# Patient Record
Sex: FEMALE | Race: WHITE | NOT HISPANIC OR LATINO | URBAN - METROPOLITAN AREA
[De-identification: names, ages, dates, MRNs, and addresses within clinical notes are randomized per-mention and may not be internally consistent; named-entity substitution may affect disease eponyms.]

---

## 2017-08-28 PROBLEM — E03.9 HYPOTHYROIDISM AFFECTING PREGNANCY IN SECOND TRIMESTER: Status: ACTIVE | Noted: 2017-08-28

## 2017-08-28 PROBLEM — O99.212 OBESITY AFFECTING PREGNANCY IN SECOND TRIMESTER: Status: ACTIVE | Noted: 2017-08-28

## 2017-08-28 PROBLEM — O09.522 ELDERLY MULTIGRAVIDA IN SECOND TRIMESTER: Status: ACTIVE | Noted: 2017-08-28

## 2017-08-28 PROBLEM — O99.282 HYPOTHYROIDISM AFFECTING PREGNANCY IN SECOND TRIMESTER: Status: ACTIVE | Noted: 2017-08-28

## 2017-12-13 PROBLEM — O47.9 UTERINE CONTRACTIONS DURING PREGNANCY: Status: ACTIVE | Noted: 2017-12-13

## 2017-12-13 PROBLEM — Z37.9 NORMAL LABOR: Status: ACTIVE | Noted: 2017-12-13

## 2018-03-19 PROBLEM — Z37.9 NORMAL LABOR: Status: RESOLVED | Noted: 2017-12-13 | Resolved: 2018-03-19

## 2019-02-01 ENCOUNTER — OFFICE VISIT (OUTPATIENT)
Dept: URGENT CARE | Facility: CLINIC | Age: 39
End: 2019-02-01
Payer: COMMERCIAL

## 2019-02-01 VITALS
SYSTOLIC BLOOD PRESSURE: 151 MMHG | TEMPERATURE: 98 F | DIASTOLIC BLOOD PRESSURE: 94 MMHG | HEART RATE: 97 BPM | WEIGHT: 264 LBS | HEIGHT: 69 IN | OXYGEN SATURATION: 99 % | BODY MASS INDEX: 39.1 KG/M2

## 2019-02-01 DIAGNOSIS — J40 BRONCHITIS: Primary | ICD-10-CM

## 2019-02-01 PROCEDURE — 99213 PR OFFICE/OUTPT VISIT, EST, LEVL III, 20-29 MIN: ICD-10-PCS | Mod: S$GLB,,, | Performed by: FAMILY MEDICINE

## 2019-02-01 PROCEDURE — 99213 OFFICE O/P EST LOW 20 MIN: CPT | Mod: S$GLB,,, | Performed by: FAMILY MEDICINE

## 2019-02-01 PROCEDURE — 3008F BODY MASS INDEX DOCD: CPT | Mod: CPTII,S$GLB,, | Performed by: FAMILY MEDICINE

## 2019-02-01 PROCEDURE — 3008F PR BODY MASS INDEX (BMI) DOCUMENTED: ICD-10-PCS | Mod: CPTII,S$GLB,, | Performed by: FAMILY MEDICINE

## 2019-02-01 RX ORDER — AZITHROMYCIN 250 MG/1
250 TABLET, FILM COATED ORAL DAILY
Qty: 6 TABLET | Refills: 0 | Status: SHIPPED | OUTPATIENT
Start: 2019-02-01 | End: 2019-02-07

## 2019-02-01 RX ORDER — ALBUTEROL SULFATE 90 UG/1
2 AEROSOL, METERED RESPIRATORY (INHALATION) EVERY 6 HOURS PRN
Qty: 18 G | Refills: 0 | Status: SHIPPED | OUTPATIENT
Start: 2019-02-01 | End: 2020-02-01

## 2019-02-01 NOTE — PROGRESS NOTES
"Subjective:       Patient ID: Christina Montano is a 38 y.o. female.    Vitals:  height is 5' 9" (1.753 m) and weight is 119.7 kg (264 lb). Her oral temperature is 98.2 °F (36.8 °C). Her blood pressure is 151/94 (abnormal) and her pulse is 97. Her oxygen saturation is 99%.     Chief Complaint: URI    Xyesterday, Last Robitussin Multi-Symptom dose this AM @7.       URI    This is a new problem. The current episode started yesterday. The problem has been gradually worsening. Associated symptoms include congestion (nasal&chest), coughing and ear pain (occasional-bilateral). Pertinent negatives include no nausea, rash, sinus pain, sore throat, vomiting or wheezing. She has tried decongestant, antihistamine and acetaminophen for the symptoms. The treatment provided mild relief.       Constitution: Negative for chills, sweating, fatigue and fever.   HENT: Positive for ear pain (occasional-bilateral), congestion (nasal&chest), postnasal drip and sinus pressure. Negative for sinus pain, sore throat and voice change.    Neck: Negative for painful lymph nodes.   Eyes: Negative for eye redness.   Respiratory: Positive for cough and shortness of breath. Negative for chest tightness, sputum production, bloody sputum, COPD, stridor, wheezing and asthma.    Gastrointestinal: Negative for nausea and vomiting.   Musculoskeletal: Negative for muscle ache.   Skin: Negative for rash.   Allergic/Immunologic: Negative for seasonal allergies and asthma.   Hematologic/Lymphatic: Negative for swollen lymph nodes.       Objective:      Physical Exam   Constitutional: She appears well-developed and well-nourished.   HENT:   Head: Normocephalic and atraumatic.   Right Ear: External ear normal.   Left Ear: External ear normal.   Nose: Nose normal.   Mouth/Throat: Oropharynx is clear and moist. No oropharyngeal exudate.   Eyes: Conjunctivae are normal. Right eye exhibits no discharge. Left eye exhibits no discharge.   Cardiovascular: Normal rate, " regular rhythm and normal heart sounds.   Pulmonary/Chest: Effort normal and breath sounds normal. She has no wheezes.   Skin: Skin is warm and dry.   Psychiatric: She has a normal mood and affect. Her behavior is normal.   Vitals reviewed.      Assessment:       1. Bronchitis        Plan:         Bronchitis    Other orders  -     azithromycin (ZITHROMAX Z-LES) 250 MG tablet; Take 1 tablet (250 mg total) by mouth once daily. TWO TABS DAY ONE, ONE TAB DAYS TWO-FIVE for 6 days  Dispense: 6 tablet; Refill: 0  -     albuterol (PROVENTIL/VENTOLIN HFA) 90 mcg/actuation inhaler; Inhale 2 puffs into the lungs every 6 (six) hours as needed for Wheezing. Rescue  Dispense: 18 g; Refill: 0

## 2021-02-09 ENCOUNTER — CLINICAL SUPPORT (OUTPATIENT)
Dept: URGENT CARE | Facility: CLINIC | Age: 41
End: 2021-02-09
Payer: COMMERCIAL

## 2021-02-09 DIAGNOSIS — Z01.812 ENCOUNTER FOR SCREENING LABORATORY TESTING FOR COVID-19 VIRUS IN ASYMPTOMATIC PATIENT: Primary | ICD-10-CM

## 2021-02-09 DIAGNOSIS — Z11.52 ENCOUNTER FOR SCREENING LABORATORY TESTING FOR COVID-19 VIRUS IN ASYMPTOMATIC PATIENT: Primary | ICD-10-CM

## 2021-02-09 LAB
CTP QC/QA: YES
SARS-COV-2 RDRP RESP QL NAA+PROBE: NEGATIVE

## 2021-02-09 PROCEDURE — U0002: ICD-10-PCS | Mod: QW,S$GLB,, | Performed by: NURSE PRACTITIONER

## 2021-02-09 PROCEDURE — U0002 COVID-19 LAB TEST NON-CDC: HCPCS | Mod: QW,S$GLB,, | Performed by: NURSE PRACTITIONER

## 2021-05-10 ENCOUNTER — PATIENT MESSAGE (OUTPATIENT)
Dept: RESEARCH | Facility: HOSPITAL | Age: 41
End: 2021-05-10

## 2021-07-01 ENCOUNTER — PATIENT MESSAGE (OUTPATIENT)
Dept: ADMINISTRATIVE | Facility: OTHER | Age: 41
End: 2021-07-01

## 2022-05-20 ENCOUNTER — OFFICE VISIT (OUTPATIENT)
Dept: URGENT CARE | Facility: CLINIC | Age: 42
End: 2022-05-20
Payer: COMMERCIAL

## 2022-05-20 VITALS
TEMPERATURE: 98 F | DIASTOLIC BLOOD PRESSURE: 95 MMHG | OXYGEN SATURATION: 95 % | BODY MASS INDEX: 39.1 KG/M2 | HEART RATE: 99 BPM | RESPIRATION RATE: 18 BRPM | WEIGHT: 264 LBS | HEIGHT: 69 IN | SYSTOLIC BLOOD PRESSURE: 157 MMHG

## 2022-05-20 DIAGNOSIS — R50.9 FEVER, UNSPECIFIED FEVER CAUSE: ICD-10-CM

## 2022-05-20 DIAGNOSIS — J18.9 COMMUNITY ACQUIRED PNEUMONIA, UNSPECIFIED LATERALITY: ICD-10-CM

## 2022-05-20 DIAGNOSIS — J02.9 SORE THROAT: Primary | ICD-10-CM

## 2022-05-20 DIAGNOSIS — R06.2 WHEEZING: ICD-10-CM

## 2022-05-20 LAB
CTP QC/QA: YES
CTP QC/QA: YES
POC MOLECULAR INFLUENZA A AGN: NEGATIVE
POC MOLECULAR INFLUENZA B AGN: NEGATIVE
SARS-COV-2 RDRP RESP QL NAA+PROBE: NEGATIVE

## 2022-05-20 PROCEDURE — 87502 INFLUENZA DNA AMP PROBE: CPT | Mod: QW,S$GLB,, | Performed by: NURSE PRACTITIONER

## 2022-05-20 PROCEDURE — U0002: ICD-10-PCS | Mod: QW,S$GLB,, | Performed by: NURSE PRACTITIONER

## 2022-05-20 PROCEDURE — 3008F PR BODY MASS INDEX (BMI) DOCUMENTED: ICD-10-PCS | Mod: CPTII,S$GLB,, | Performed by: NURSE PRACTITIONER

## 2022-05-20 PROCEDURE — 3077F SYST BP >= 140 MM HG: CPT | Mod: CPTII,S$GLB,, | Performed by: NURSE PRACTITIONER

## 2022-05-20 PROCEDURE — 3008F BODY MASS INDEX DOCD: CPT | Mod: CPTII,S$GLB,, | Performed by: NURSE PRACTITIONER

## 2022-05-20 PROCEDURE — 1159F MED LIST DOCD IN RCRD: CPT | Mod: CPTII,S$GLB,, | Performed by: NURSE PRACTITIONER

## 2022-05-20 PROCEDURE — 1159F PR MEDICATION LIST DOCUMENTED IN MEDICAL RECORD: ICD-10-PCS | Mod: CPTII,S$GLB,, | Performed by: NURSE PRACTITIONER

## 2022-05-20 PROCEDURE — 71046 XR CHEST PA AND LATERAL: ICD-10-PCS | Mod: S$GLB,,, | Performed by: RADIOLOGY

## 2022-05-20 PROCEDURE — 1160F RVW MEDS BY RX/DR IN RCRD: CPT | Mod: CPTII,S$GLB,, | Performed by: NURSE PRACTITIONER

## 2022-05-20 PROCEDURE — 87502 POCT INFLUENZA A/B MOLECULAR: ICD-10-PCS | Mod: QW,S$GLB,, | Performed by: NURSE PRACTITIONER

## 2022-05-20 PROCEDURE — 1160F PR REVIEW ALL MEDS BY PRESCRIBER/CLIN PHARMACIST DOCUMENTED: ICD-10-PCS | Mod: CPTII,S$GLB,, | Performed by: NURSE PRACTITIONER

## 2022-05-20 PROCEDURE — U0002 COVID-19 LAB TEST NON-CDC: HCPCS | Mod: QW,S$GLB,, | Performed by: NURSE PRACTITIONER

## 2022-05-20 PROCEDURE — 71046 X-RAY EXAM CHEST 2 VIEWS: CPT | Mod: S$GLB,,, | Performed by: RADIOLOGY

## 2022-05-20 PROCEDURE — 99214 PR OFFICE/OUTPT VISIT, EST, LEVL IV, 30-39 MIN: ICD-10-PCS | Mod: S$GLB,,, | Performed by: NURSE PRACTITIONER

## 2022-05-20 PROCEDURE — 99214 OFFICE O/P EST MOD 30 MIN: CPT | Mod: S$GLB,,, | Performed by: NURSE PRACTITIONER

## 2022-05-20 PROCEDURE — 3080F PR MOST RECENT DIASTOLIC BLOOD PRESSURE >= 90 MM HG: ICD-10-PCS | Mod: CPTII,S$GLB,, | Performed by: NURSE PRACTITIONER

## 2022-05-20 PROCEDURE — 3080F DIAST BP >= 90 MM HG: CPT | Mod: CPTII,S$GLB,, | Performed by: NURSE PRACTITIONER

## 2022-05-20 PROCEDURE — 3077F PR MOST RECENT SYSTOLIC BLOOD PRESSURE >= 140 MM HG: ICD-10-PCS | Mod: CPTII,S$GLB,, | Performed by: NURSE PRACTITIONER

## 2022-05-20 RX ORDER — AZITHROMYCIN 250 MG/1
TABLET, FILM COATED ORAL
Qty: 6 TABLET | Refills: 0 | Status: SHIPPED | OUTPATIENT
Start: 2022-05-20

## 2022-05-20 RX ORDER — MONTELUKAST SODIUM 10 MG/1
TABLET ORAL
COMMUNITY

## 2022-05-20 RX ORDER — AMOXICILLIN AND CLAVULANATE POTASSIUM 875; 125 MG/1; MG/1
1 TABLET, FILM COATED ORAL 2 TIMES DAILY
Qty: 14 TABLET | Refills: 0 | Status: SHIPPED | OUTPATIENT
Start: 2022-05-20 | End: 2022-05-27

## 2022-05-20 RX ORDER — ALBUTEROL SULFATE 90 UG/1
2 AEROSOL, METERED RESPIRATORY (INHALATION) EVERY 6 HOURS PRN
Qty: 6.7 G | Refills: 0 | Status: SHIPPED | OUTPATIENT
Start: 2022-05-20 | End: 2023-06-08

## 2022-05-20 NOTE — PATIENT INSTRUCTIONS
Results for orders placed or performed in visit on 05/20/22   POCT COVID-19 Rapid Screening   Result Value Ref Range    POC Rapid COVID Negative Negative     Acceptable Yes    POCT Influenza A/B MOLECULAR   Result Value Ref Range    POC Molecular Influenza A Ag Negative Negative, Not Reported    POC Molecular Influenza B Ag Negative Negative, Not Reported     Acceptable Yes

## 2022-05-20 NOTE — PROGRESS NOTES
"Subjective:       Patient ID: Christina Montano is a 42 y.o. female.    Vitals:  height is 5' 9" (1.753 m) and weight is 119.7 kg (264 lb). Her temperature is 98.2 °F (36.8 °C). Her blood pressure is 157/95 (abnormal) and her pulse is 99. Her respiration is 18 and oxygen saturation is 95%.     Chief Complaint: Cough    Patient presents to the clinic today with a sore throat on Wednesday, followed by a cough  that started on Thursday with a fever at night of 101.0 F. Tylenol taken with mild relief.Pt is not vaccinated for Covid or flu.  Denies hx pneumonia, sony smoking     Cough  This is a new problem. The current episode started in the past 7 days. The problem has been gradually worsening. The problem occurs constantly. The cough is non-productive. Associated symptoms include ear pain, a fever and nasal congestion. Associated symptoms comments: Right ear. Treatments tried: Tylenol.       Constitution: Positive for fever.   HENT: Positive for ear pain.    Respiratory: Positive for cough.        Objective:      Physical Exam   Constitutional: She is oriented to person, place, and time. She appears well-developed. She is cooperative.  Non-toxic appearance. She appears ill. No distress.   HENT:   Head: Normocephalic and atraumatic.   Ears:   Right Ear: Hearing, tympanic membrane, external ear and ear canal normal.   Left Ear: Hearing, tympanic membrane, external ear and ear canal normal.   Nose: Nose normal. No mucosal edema, rhinorrhea or nasal deformity. No epistaxis. Right sinus exhibits no maxillary sinus tenderness and no frontal sinus tenderness. Left sinus exhibits no maxillary sinus tenderness and no frontal sinus tenderness.   Mouth/Throat: Uvula is midline, oropharynx is clear and moist and mucous membranes are normal. Mucous membranes are moist. No trismus in the jaw. Normal dentition. No uvula swelling. No oropharyngeal exudate, posterior oropharyngeal edema or posterior oropharyngeal erythema.   Eyes: " Conjunctivae and lids are normal. No scleral icterus.   Neck: Trachea normal and phonation normal. Neck supple. No edema present. No erythema present. No neck rigidity present.   Cardiovascular: Regular rhythm, normal heart sounds and normal pulses. Tachycardia present.   Pulmonary/Chest: Effort normal. No respiratory distress. She has no decreased breath sounds. She has wheezes. She has rhonchi.   Abdominal: Normal appearance.   Musculoskeletal: Normal range of motion.         General: No deformity. Normal range of motion.   Neurological: She is alert and oriented to person, place, and time. She exhibits normal muscle tone. Coordination normal.   Skin: Skin is warm, intact, diaphoretic and not pale.   Psychiatric: Her speech is normal and behavior is normal. Mood, judgment and thought content normal.   Nursing note and vitals reviewed.        Assessment:       1. Sore throat    2. Wheezing    3. Fever, unspecified fever cause    4. Community acquired pneumonia, unspecified laterality          Plan:         Sore throat  -     POCT COVID-19 Rapid Screening  -     POCT Influenza A/B MOLECULAR  -     XR CHEST PA AND LATERAL; Future; Expected date: 05/20/2022    Wheezing  -     XR CHEST PA AND LATERAL; Future; Expected date: 05/20/2022    Fever, unspecified fever cause  -     XR CHEST PA AND LATERAL; Future; Expected date: 05/20/2022    Community acquired pneumonia, unspecified laterality    Other orders  -     albuterol (PROVENTIL HFA) 90 mcg/actuation inhaler; Inhale 2 puffs into the lungs every 6 (six) hours as needed for Wheezing. Rescue  Dispense: 6.7 g; Refill: 0  -     amoxicillin-clavulanate 875-125mg (AUGMENTIN) 875-125 mg per tablet; Take 1 tablet by mouth 2 (two) times daily. for 7 days  Dispense: 14 tablet; Refill: 0  -     azithromycin (ZITHROMAX) 250 MG tablet; Take 2 pills on day one and then one pill daily for 4 days  Dispense: 6 tablet; Refill: 0    I felt as if there is a consolidation right lower  lobe, will treat for CAD, due to fever low ox wheezing rhonchi and elevated pulse       XR CHEST PA AND LATERAL    Result Date: 5/20/2022  EXAMINATION: XR CHEST PA AND LATERAL CLINICAL HISTORY: Acute pharyngitis, unspecified TECHNIQUE: PA and lateral views of the chest were performed. COMPARISON: None FINDINGS: The lungs are clear, with normal appearance of pulmonary vasculature and no pleural effusion or pneumothorax. The cardiac silhouette is normal in size. The hilar and mediastinal contours are unremarkable. Bones are intact.     No acute abnormality. Electronically signed by: Duarte Muse MD Date:    05/20/2022 Time:    11:44       Patient Instructions     Results for orders placed or performed in visit on 05/20/22   POCT COVID-19 Rapid Screening   Result Value Ref Range    POC Rapid COVID Negative Negative     Acceptable Yes    POCT Influenza A/B MOLECULAR   Result Value Ref Range    POC Molecular Influenza A Ag Negative Negative, Not Reported    POC Molecular Influenza B Ag Negative Negative, Not Reported     Acceptable Yes

## 2023-06-08 ENCOUNTER — OFFICE VISIT (OUTPATIENT)
Dept: URGENT CARE | Facility: CLINIC | Age: 43
End: 2023-06-08
Payer: COMMERCIAL

## 2023-06-08 VITALS
DIASTOLIC BLOOD PRESSURE: 88 MMHG | SYSTOLIC BLOOD PRESSURE: 143 MMHG | WEIGHT: 263.88 LBS | TEMPERATURE: 98 F | OXYGEN SATURATION: 95 % | BODY MASS INDEX: 39.08 KG/M2 | HEIGHT: 69 IN | HEART RATE: 98 BPM

## 2023-06-08 DIAGNOSIS — R05.1 ACUTE COUGH: ICD-10-CM

## 2023-06-08 DIAGNOSIS — J02.9 SORE THROAT: ICD-10-CM

## 2023-06-08 DIAGNOSIS — J20.9 ACUTE BRONCHITIS, UNSPECIFIED ORGANISM: Primary | ICD-10-CM

## 2023-06-08 LAB
CTP QC/QA: YES
MOLECULAR STREP A: NEGATIVE

## 2023-06-08 PROCEDURE — 87651 POCT STREP A MOLECULAR: ICD-10-PCS | Mod: QW,S$GLB,, | Performed by: NURSE PRACTITIONER

## 2023-06-08 PROCEDURE — 94640 AIRWAY INHALATION TREATMENT: CPT | Mod: S$GLB,,, | Performed by: NURSE PRACTITIONER

## 2023-06-08 PROCEDURE — 94640 PR INHAL RX, AIRWAY OBST/DX SPUTUM INDUCT: ICD-10-PCS | Mod: S$GLB,,, | Performed by: NURSE PRACTITIONER

## 2023-06-08 PROCEDURE — 99213 PR OFFICE/OUTPT VISIT, EST, LEVL III, 20-29 MIN: ICD-10-PCS | Mod: 25,S$GLB,, | Performed by: NURSE PRACTITIONER

## 2023-06-08 PROCEDURE — 71046 X-RAY EXAM CHEST 2 VIEWS: CPT | Mod: S$GLB,,, | Performed by: RADIOLOGY

## 2023-06-08 PROCEDURE — 87651 STREP A DNA AMP PROBE: CPT | Mod: QW,S$GLB,, | Performed by: NURSE PRACTITIONER

## 2023-06-08 PROCEDURE — 99213 OFFICE O/P EST LOW 20 MIN: CPT | Mod: 25,S$GLB,, | Performed by: NURSE PRACTITIONER

## 2023-06-08 PROCEDURE — 71046 XR CHEST PA AND LATERAL: ICD-10-PCS | Mod: S$GLB,,, | Performed by: RADIOLOGY

## 2023-06-08 RX ORDER — BENZONATATE 100 MG/1
100 CAPSULE ORAL 3 TIMES DAILY PRN
Qty: 15 CAPSULE | Refills: 0 | Status: SHIPPED | OUTPATIENT
Start: 2023-06-08 | End: 2023-06-13

## 2023-06-08 RX ORDER — ALBUTEROL SULFATE 0.83 MG/ML
2.5 SOLUTION RESPIRATORY (INHALATION) EVERY 6 HOURS PRN
Qty: 36 ML | Refills: 0 | Status: SHIPPED | OUTPATIENT
Start: 2023-06-08 | End: 2023-06-11

## 2023-06-08 RX ORDER — IPRATROPIUM BROMIDE 0.5 MG/2.5ML
0.5 SOLUTION RESPIRATORY (INHALATION)
Status: COMPLETED | OUTPATIENT
Start: 2023-06-08 | End: 2023-06-08

## 2023-06-08 RX ORDER — ALBUTEROL SULFATE 0.83 MG/ML
2.5 SOLUTION RESPIRATORY (INHALATION)
Status: COMPLETED | OUTPATIENT
Start: 2023-06-08 | End: 2023-06-08

## 2023-06-08 RX ORDER — METHYLPREDNISOLONE 4 MG/1
TABLET ORAL
Qty: 21 EACH | Refills: 0 | Status: SHIPPED | OUTPATIENT
Start: 2023-06-08 | End: 2023-06-29

## 2023-06-08 RX ORDER — IPRATROPIUM BROMIDE AND ALBUTEROL SULFATE 2.5; .5 MG/3ML; MG/3ML
3 SOLUTION RESPIRATORY (INHALATION)
Status: DISCONTINUED | OUTPATIENT
Start: 2023-06-08 | End: 2023-06-08

## 2023-06-08 RX ADMIN — ALBUTEROL SULFATE 2.5 MG: 0.83 SOLUTION RESPIRATORY (INHALATION) at 03:06

## 2023-06-08 RX ADMIN — IPRATROPIUM BROMIDE 0.5 MG: 0.5 SOLUTION RESPIRATORY (INHALATION) at 03:06

## 2023-06-08 NOTE — PROGRESS NOTES
"Subjective:      Patient ID: Christina Montano is a 43 y.o. female.    Vitals:  height is 5' 9" (1.753 m) and weight is 119.7 kg (263 lb 14.3 oz). Her oral temperature is 97.7 °F (36.5 °C). Her blood pressure is 143/88 (abnormal) and her pulse is 98. Her oxygen saturation is 95%.     Chief Complaint: Cough    Pt present today c/o cough and congestion. Been taking otc cough meds. Started five days ago.  Patient has a history of asthma.  Patient has been using inhaler at home.  Patient denies any chest pain or shortness of breath.  Denies any fevers.    Cough  This is a new problem. The current episode started in the past 7 days. The problem has been unchanged. The problem occurs constantly. The cough is Non-productive. Associated symptoms include nasal congestion and postnasal drip. Nothing aggravates the symptoms. She has tried OTC cough suppressant for the symptoms. The treatment provided no relief.     HENT:  Positive for postnasal drip.    Respiratory:  Positive for cough.     Objective:     Physical Exam   Constitutional: She is oriented to person, place, and time. She appears well-developed. She is cooperative.  Non-toxic appearance. She does not appear ill. No distress.   HENT:   Head: Normocephalic and atraumatic.   Ears:   Right Ear: Hearing, tympanic membrane, external ear and ear canal normal.   Left Ear: Hearing, tympanic membrane, external ear and ear canal normal.   Nose: Nose normal. No mucosal edema, rhinorrhea or nasal deformity. No epistaxis. Right sinus exhibits no maxillary sinus tenderness and no frontal sinus tenderness. Left sinus exhibits no maxillary sinus tenderness and no frontal sinus tenderness.   Mouth/Throat: Uvula is midline, oropharynx is clear and moist and mucous membranes are normal. No trismus in the jaw. Normal dentition. No uvula swelling. No oropharyngeal exudate, posterior oropharyngeal edema or posterior oropharyngeal erythema.   Eyes: Conjunctivae and lids are normal. No scleral " icterus.   Neck: Trachea normal and phonation normal. Neck supple. No edema present. No erythema present. No neck rigidity present.   Cardiovascular: Normal rate, regular rhythm, normal heart sounds and normal pulses.   Pulmonary/Chest: Effort normal. No respiratory distress. She has no decreased breath sounds. She has wheezes in the right upper field and the left upper field. She has no rhonchi.   Abdominal: Normal appearance.   Musculoskeletal: Normal range of motion.         General: No deformity. Normal range of motion.   Neurological: She is alert and oriented to person, place, and time. She exhibits normal muscle tone. Coordination normal.   Skin: Skin is warm, dry, intact, not diaphoretic and not pale.   Psychiatric: Her speech is normal and behavior is normal. Judgment and thought content normal.   Nursing note and vitals reviewed.    Assessment:     1. Acute bronchitis, unspecified organism    2. Sore throat    3. 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  Talked to the [facility] who approved the chest x-ray.    Results for orders placed or performed in visit on 06/08/23   POCT Strep A, Molecular   Result Value Ref Range    Molecular Strep A, POC Negative Negative     Acceptable Yes        XR CHEST PA AND LATERAL    Result Date: 6/8/2023  EXAMINATION: XR CHEST PA AND LATERAL CLINICAL HISTORY: Acute cough TECHNIQUE: PA and lateral views of the chest were performed. COMPARISON: Chest x-ray 05/20/2022. FINDINGS: The lungs are clear, with normal appearance of pulmonary vasculature and no pleural effusion or pneumothorax. The cardiac silhouette is normal in size. The hilar and mediastinal contours are unremarkable. No significant bony abnormality.     No acute abnormality. Electronically signed by: Bong Uribe Date:    06/08/2023 Time:    15:52     Acute bronchitis, unspecified organism  -     Discontinue: albuterol-ipratropium 2.5  mg-0.5 mg/3 mL nebulizer solution 3 mL  -     XR CHEST PA AND LATERAL; Future; Expected date: 06/08/2023  -     albuterol nebulizer solution 2.5 mg  -     ipratropium 0.02 % nebulizer solution 0.5 mg  -     methylPREDNISolone (MEDROL DOSEPACK) 4 mg tablet; use as directed  Dispense: 21 each; Refill: 0    Sore throat  -     POCT Strep A, Molecular    Acute cough  -     benzonatate (TESSALON) 100 MG capsule; Take 1 capsule (100 mg total) by mouth 3 (three) times daily as needed for Cough.  Dispense: 15 capsule; Refill: 0    Other orders  -     albuterol (PROVENTIL) 2.5 mg /3 mL (0.083 %) nebulizer solution; Take 3 mLs (2.5 mg total) by nebulization every 6 (six) hours as needed for Wheezing. Rescue  Dispense: 36 mL; Refill: 0      Patient Instructions   -chest x-ray today is normal.    -Medrol Dosepak called into pharmacy as well as Tessalon Perles to use as needed for coughing.    -is advised to continue using her inhaler as needed.    -continue Mucinex.    -follow up with your primary care doctor.

## 2023-06-08 NOTE — PATIENT INSTRUCTIONS
-chest x-ray today is normal.    -Medrol Dosepak called into pharmacy as well as Tessalon Perles to use as needed for coughing.    -is advised to continue using her inhaler as needed.    -continue Mucinex.    -follow up with your primary care doctor.

## 2023-07-26 ENCOUNTER — DOCUMENTATION ONLY (OUTPATIENT)
Dept: ADMINISTRATIVE | Facility: OTHER | Age: 43
End: 2023-07-26
Payer: COMMERCIAL

## 2023-07-31 NOTE — PROGRESS NOTES
RADIATION ONCOLOGY CONSULTATION  [facility]AL[facility]      NAME: Christina Montano    : 1980 SEX: F MR#: [id number]   DIAGNOSIS: Left breast ductal carcinoma in situ   REFERRING PHYSICIAN: Bryn Castle M.D.   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  The patient is being seen in consultation for consideration of radiotherapy at the request of Dr. Castle.    HISTORY OF PRESENT ILLNESS:  The patient reports that her first ever bilateral screening mammogram on 2022, demonstrated a cluster of calcifications at the 3 o'clock position in the left breast 4 cm from the nipple without a dominant mass noted, read as BI-RADS 0.     On 2023, she underwent a left diagnostic mammogram which demonstrated a cluster of pleomorphic microcalcifications in the left breast 4 cm from the nipple without associated asymmetric density.  A left breast ultrasound showed no abnormalities.  There is no mention of the axillary lymph nodes on the radiology report and they do not appear to have been assessed, with the imaging read as BI-RADS 0.     On 2023, she underwent a stereotactic left breast core needle biopsy with clip placement.  Pathology demonstrated grade II ductal carcinoma in situ with necrosis and associated calcifications.  The length of disease and subtype are not noted.  The tumor was estrogen receptor 96.1% positive and progesterone receptor 93.5% positive.     She saw Dr. Castle of Surgery on 2023, at which time he discussed the diagnosis and treatment options, with the patient voicing a preference for breast conservation therapy.  Genetic testing was ordered which was  negative for mutation.  An MRI was also ordered; however, this was ultimately not performed secondary to claustrophobia.     A left breast ultrasound on 05/19/2023, showed a 3.2 x 1.2 x 3.5 cm heterogeneous hypoechoic focus consistent with a lymph node, with abnormal morphology.  There was a second lymph node measuring 4.0 x 1.3 x 2.6 cm.  Left axillary on 06/07/2023, revealed a 4.9 x 1.4 x 2 cm left axillary lymph node with the second axillary lymph node no longer identified.  Ultrasound-guided axillary lymph node core needle biopsy with clip placement at that time demonstrated no evidence of malignancy.     On 06/29/2023, she underwent a uterine D and C by Dr. Wong for abnormal bleeding.  There was disordered proliferative endometrium with tubal metaplasia and focal cystic hyperplasia without atypia or malignancy.     On the same date, she underwent a ANNIKA  lumpectomy and sentinel lymph node biopsy by Dr. Castle.  The primary specimen measured 9.2 x 9.2 x 4.4 cm and demonstrated 3 mm of residual grade II ductal carcinoma in situ with central necrosis, cribriform type.  There was a focally positive posterior margin over 1.5 mm on the primary specimen; however, an additional deep margin was taken measuring 3.5 x 5.8 x 2.1 cm without evidence of malignancy.  There were 0/3 positive lymph nodes and the patient was thus staged as pTispN0.     She saw Dr. Castle postoperatively on 07/12/2023, who felt that she was healing well at that time.  It was noted that the patient was leaning towards radiation without endocrine therapy, and was referred to Medical Oncology and Radiation Oncology.  She saw Dr. Zhang on 07/13/2023, with the recommendation for five years of tamoxifen following radiation.    REVIEW OF SYSTEMS:  The patient denies any breast masses, rashes, nipple discharge or axillary lymphadenopathy.  She denies any recurrent vaginal bleeding or discharge.  She denies any high fevers, shaking chills,  drenching night sweats or recent weight loss.     She denies any recent changes in vision, hearing or swallowing, shortness breath at rest, dyspnea on exertion, cough, chest pain, abdominal pain, nausea, vomiting, constipation, diarrhea, bright-red blood per rectum or urinary symptoms.  She denies any focal numbness, tingling, weakness, severe headaches, diplopia or ataxia.  All other systems reviewed and negative.    PAST MEDICAL HISTORY:  Ductal carcinoma in situ as above, seasonal allergies.    PAST SURGICAL HISTORY:  Status post nasal polyp surgeries, status post lumpectomy with sentinel lymph node biopsy as above, status post uterine D and C as above.    PAST GYNECOLOGIC HISTORY:  .  Menarche at the age of 14.  First live birth at the age of 33.  She  for three months.  She is premenopausal with irregular menses.  No history of oral contraceptive pill or hormone replacement therapy use.    PAST RADIATION THERAPY:  None.    MEDICATIONS:  Ventolin, vitamin D, multivitamin, Singulair.    ALLERGIES:  No known drug allergies.    FAMILY HISTORY:  No family history of malignancy, including breast or ovarian cancer.    SOCIAL HISTORY:  The patient does not smoke, she drinks alcohol socially and denies illicit drug use.  She lives in [place], is  and has two children.  In the past, she worked as a  and is originally from [place], immigrated at the age of 14.    PHYSICAL EXAMINATION:  VITAL SIGNS:  Blood pressure 174/99, heart rate 88, temperature 98.1, height 5 feet 9 inches, weight 280 pounds, oxygen saturation 98% on room air.  ECOG score of 0.   GENERAL:  The patient is a pleasant well-nourished, well-developed  female in no acute distress.   HEENT:  Normocephalic, atraumatic.  Extraocular muscles intact. Pupils equally round and reactive to light.  Sclerae anicteric.  Oral cavity and oropharynx are clear without erythema, exudate, masses or ulcerations.   NECK:   Supple without lymphadenopathy or thyromegaly.   HEART:  Regular rate and rhythm.  Normal S1, S2.  No murmurs, gallops or rubs.     LUNGS:  Clear to auscultation bilaterally.  No wheezes, rhonchi or rales.   BACK:  No spinal or costovertebral angle tenderness.   ABDOMEN:  Soft, nontender and nondistended.  No masses or hepatosplenomegaly.   EXTREMITIES:  Warm and well perfused.  No clubbing, cyanosis or edema.   NEUROLOGIC:  Cranial nerves two through 12 grossly intact.  Motor and sensory intact bilaterally.  Finger-nose-finger and gait within normal limits. 1+ patellar deep reflexes.  No clonus.   BREASTS:  The right breast is soft and nontender without palpable masses or axillary lymphadenopathy.  The left breast demonstrates a well-healed 5.5 cm axillary scar, and there is a 7 cm curvilinear well-healed lumpectomy scar 3 cm from the nipple, from the 1:30 to 3:30 position, with resolving surrounding ecchymoses consistent with recent surgery.  There are otherwise no suspicious masses, rashes, nipple discharge or axillary lymphadenopathy.    LABORATORIES:  On 06/27/2023, white blood cells 6.5, hemoglobin 13.1, hematocrit 37.9, platelets 204.  Sodium 138, potassium 4.1, chloride 108, CO2 of 26, BUN 13, creatinine 0.64, glucose 102, AST 25, ALT 37, total bilirubin 0.5, alkaline phosphatase 70.    ASSESSMENT AND PLAN:  The patient is a 43-year-old premenopausal female who presents with a newly diagnosed stage 0 [id number] left lateral breast grade II ductal carcinoma in situ (ER positive, NE positive), status post lumpectomy and negative sentinel lymph node biopsy.     Based upon this patient's history and presentation, I believe that she is an appropriate candidate for external beam radiation therapy.  We discussed the nature of ductal carcinoma in situ, as well as its overall management, as well as excellent expected overall prognosis with appropriate treatment.  We discussed the patient's two basic surgical options  "being mastectomy versus breast conservation therapy consisting of lumpectomy, followed by radiation with the overall survival being equivalent with either treatment modality.  The patient has undergone a lumpectomy with negative surgical margins, as well as negative sentinel lymph node biopsy, from which she appears to be healing well and for which I feel that she was indeed an appropriate candidate.   As Ms. Montano initially presented with abnormal microcalcifications on both screening and diagnostic mammograms, she will first require a postoperative mammogram to rule out any residual suspicious microcalcifications prior to proceeding with adjuvant treatment.     With regard to systemic therapy, she understands that there is no role for cytotoxic chemotherapy in the setting of ductal carcinoma in situ.  She has been evaluated by Dr. Zhang of Medical Oncology, who has recommended five years of tamoxifen following radiation, which the patient states she is still considering.  She also reports that she plans to see Dr. Mayen for an additional opinion regarding endocrine therapy following radiation.  I briefly discussed her case with Dr. Mayen, who feels that she may potentially be a candidate for a lower dose of "babytam", particularly given her young age, which I encouraged her to further discuss at their upcoming appointment.     As for radiation, I explained to the patient that the role of treatment is to reduce the risk of local and/or regional recurrence in the setting of in situ disease by approximately one-half to two-thirds.  In her particular case, I would estimate this risk to be on the order of approximately 25%.  She is aware that the small, estrogen receptor positive tumor portends a better prognosis, however, she may be at increased risk for recurrence given her young premenopausal status.     In Ms. Montano's case, I would plan to treat the breast without the need for prophylactic treatment of the " supraclavicular fossa given the negative lymph node.  Although an additional radiation boost to the tumor bed is typically considered in younger patients, for which it has been shown to be most beneficial to increase local control, it is unclear whether this will be technically possible given the surgical specimen site, and therefore decisions will be made following simulation when additional anatomic information is available.     The risks, benefits, side effects, alternatives to, indications for and mechanisms of external beam radiation therapy were explained in full to the patient.  She asked multiple appropriate questions, all of which were answered to her apparent satisfaction.  This conversation included a discussion of possible short-term side effects, including but not limited to dermatitis with skin tenderness, erythema, pruritus, possible desquamation, local hair loss and fatigue.      We also discussed the possible long-term side effects, including but not limited to residual hyperpigmentation, telangiectasias, the change in the shape, size and/or consistency of the breast, low risk of ipsilateral rib fracture with trauma, very low risk of pneumonitis and/or pulmonary fibrosis, and less than 0.5% risk of secondary tumor formation over decades.  She may potentially benefit from radiotherapy delivered utilizing a deep inspiratory breath-hold technique in order to minimize dose to the underlying cardiac structures as this is a left-sided tumor, to be determined at the time of simulation.  She agreed with the proposed treatment plan and informed consent was obtained.     I will continue to follow Ms. Montano as she undergoes postoperative mammography within the next seven to 10 days to rule out residual suspicious microcalcifications, which I think are unlikely to remain. If appropriate, she would return to [facility] to undergo CT-guided simulation in the supine treatment position with  the use of a tilt board and Vac-Cristobal for custom immobilization.  After a customized treatment plan has been designed, she would undergo verification films and initiate a course of forward-planned 3D conformal radiotherapy to the left breast.     Thank you very much, Dr. Castle, for this consultation and the opportunity to participate in the care of this patient.  Please do not hesitate to contact me should you have any questions, concerns and/or require additional information.        TRENTON Bentley MD

## 2024-12-17 ENCOUNTER — OFFICE VISIT (OUTPATIENT)
Dept: URGENT CARE | Facility: CLINIC | Age: 44
End: 2024-12-17
Payer: COMMERCIAL

## 2024-12-17 VITALS
HEIGHT: 69 IN | DIASTOLIC BLOOD PRESSURE: 85 MMHG | TEMPERATURE: 101 F | WEIGHT: 275 LBS | HEART RATE: 125 BPM | OXYGEN SATURATION: 95 % | SYSTOLIC BLOOD PRESSURE: 152 MMHG | BODY MASS INDEX: 40.73 KG/M2 | RESPIRATION RATE: 18 BRPM

## 2024-12-17 DIAGNOSIS — R50.9 FEVER, UNSPECIFIED FEVER CAUSE: ICD-10-CM

## 2024-12-17 DIAGNOSIS — J10.1 INFLUENZA A: Primary | ICD-10-CM

## 2024-12-17 LAB
CTP QC/QA: YES
POC MOLECULAR INFLUENZA A AGN: POSITIVE
POC MOLECULAR INFLUENZA B AGN: NEGATIVE

## 2024-12-17 PROCEDURE — 99213 OFFICE O/P EST LOW 20 MIN: CPT | Mod: S$GLB,,, | Performed by: PHYSICIAN ASSISTANT

## 2024-12-17 PROCEDURE — 87502 INFLUENZA DNA AMP PROBE: CPT | Mod: QW,S$GLB,, | Performed by: PHYSICIAN ASSISTANT

## 2024-12-17 RX ORDER — BALOXAVIR MARBOXIL 80 MG/1
80 TABLET, FILM COATED ORAL ONCE
Qty: 1 TABLET | Refills: 0 | Status: SHIPPED | OUTPATIENT
Start: 2024-12-17 | End: 2024-12-18

## 2024-12-17 RX ORDER — ALBUTEROL SULFATE 90 UG/1
2 INHALANT RESPIRATORY (INHALATION) EVERY 6 HOURS PRN
Qty: 18 G | Refills: 0 | Status: SHIPPED | OUTPATIENT
Start: 2024-12-17

## 2024-12-17 RX ORDER — IBUPROFEN 800 MG/1
800 TABLET ORAL 3 TIMES DAILY
Qty: 15 TABLET | Refills: 0 | Status: SHIPPED | OUTPATIENT
Start: 2024-12-17 | End: 2024-12-22

## 2024-12-17 NOTE — LETTER
December 17, 2024      [facility] [place] [address], [address]  [facility] [address]  Phone: [phone]  Fax: [phone]       Patient: Christina Montano   YOB: 1980  Date of Visit: 12/17/2024    To Whom It May Concern:    Mika Montano  was at [facility] on 12/17/2024. She may return to work/school on 12/20/24 with no restrictions. If you have any questions or concerns, or if I can be of further assistance, please do not hesitate to contact me.    Sincerely,     JEAN-CLAUDE Blanco        Dizziness

## 2024-12-17 NOTE — PROGRESS NOTES
"Subjective:      Patient ID: Christina Montano is a 44 y.o. female.    Vitals:  height is 5' 9" (1.753 m) and weight is 124.7 kg (275 lb). Her oral temperature is 101.1 °F (38.4 °C) (abnormal). Her blood pressure is 152/85 (abnormal) and her pulse is 125 (abnormal). Her respiration is 18 and oxygen saturation is 95%.     Chief Complaint: Cough    Pt has a sore throat, fever (100.2), cough, and bodyaches. Pt symptoms started three days ago. Pt has been taking OTC meds to help treat her symptoms    Cough  This is a new problem. The current episode started in the past 7 days. The problem has been gradually worsening. The problem occurs constantly. The cough is Productive of sputum. Associated symptoms include a fever, postnasal drip, a sore throat and sweats. Pertinent negatives include no chest pain, chills, ear pain, eye redness, headaches, heartburn, hemoptysis, myalgias, rash, shortness of breath or wheezing. She has tried OTC cough suppressant for the symptoms. The treatment provided no relief.       Constitution: Positive for fever. Negative for chills, sweating and fatigue.   HENT:  Positive for congestion, postnasal drip and sore throat. Negative for ear pain, drooling, trouble swallowing and voice change.    Neck: Negative for neck pain, neck stiffness, painful lymph nodes and neck swelling.   Cardiovascular:  Negative for chest pain, leg swelling, palpitations, sob on exertion and passing out.   Eyes:  Negative for eye pain, eye redness, photophobia, double vision, blurred vision and eyelid swelling.   Respiratory:  Positive for cough. Negative for chest tightness, sputum production, bloody sputum, shortness of breath, stridor and wheezing.    Gastrointestinal:  Negative for abdominal pain, abdominal bloating, nausea, vomiting, constipation, diarrhea and heartburn.   Musculoskeletal:  Negative for joint pain, joint swelling, abnormal ROM of joint, back pain, muscle cramps and muscle ache.   Skin:  Negative for " rash and hives.   Allergic/Immunologic: Negative for seasonal allergies, food allergies, hives, itching and sneezing.   Neurological:  Negative for dizziness, light-headedness, passing out, loss of balance, headaches, altered mental status, loss of consciousness and seizures.   Hematologic/Lymphatic: Negative for swollen lymph nodes.   Psychiatric/Behavioral:  Negative for altered mental status and nervous/anxious. The patient is not nervous/anxious.       Objective:     Physical Exam   Constitutional: She is oriented to person, place, and time. She appears well-developed. She is cooperative.  Non-toxic appearance. She does not appear ill. No distress.   HENT:   Head: Normocephalic and atraumatic.   Ears:   Right Ear: Hearing, tympanic membrane, external ear and ear canal normal.   Left Ear: Hearing, tympanic membrane, external ear and ear canal normal.   Nose: Mucosal edema and rhinorrhea present. No nasal deformity. No epistaxis. Right sinus exhibits no maxillary sinus tenderness and no frontal sinus tenderness. Left sinus exhibits no maxillary sinus tenderness and no frontal sinus tenderness.   Mouth/Throat: Uvula is midline and mucous membranes are normal. No trismus in the jaw. Normal dentition. No uvula swelling. Posterior oropharyngeal erythema and cobblestoning present. No oropharyngeal exudate, posterior oropharyngeal edema or tonsillar abscesses. No tonsillar exudate.   Eyes: Conjunctivae and lids are normal. No scleral icterus.   Neck: Trachea normal and phonation normal. Neck supple. No edema present. No erythema present. No neck rigidity present.   Cardiovascular: Normal rate, regular rhythm, normal heart sounds and normal pulses.   Pulmonary/Chest: Effort normal and breath sounds normal. No accessory muscle usage or stridor. No respiratory distress. She has no decreased breath sounds. She has no wheezes. She has no rhonchi. She has no rales.   Abdominal: Normal appearance.   Musculoskeletal: Normal  range of motion.         General: No deformity or edema. Normal range of motion.   Lymphadenopathy:     She has no cervical adenopathy.   Neurological: She is alert and oriented to person, place, and time. She exhibits normal muscle tone. Coordination normal.   Skin: Skin is warm, dry, intact, not diaphoretic, not pale and no rash. Capillary refill takes less than 2 seconds.   Psychiatric: Her speech is normal and behavior is normal. Judgment and thought content normal.   Nursing note and vitals reviewed.    Results for orders placed or performed in visit on 12/17/24   POCT Influenza A/B MOLECULAR    Collection Time: 12/17/24  4:12 PM   Result Value Ref Range    POC Molecular Influenza A Ag Positive (A) Negative    POC Molecular Influenza B Ag Negative Negative     Acceptable Yes        Assessment:     1. Influenza A    2. Fever, unspecified fever cause        Plan:       Influenza A    Fever, unspecified fever cause  -     POCT Influenza A/B MOLECULAR    Other orders  -     baloxavir marboxiL (XOFLUZA) 80 mg tablet; Take 1 tablet (80 mg total) by mouth once. for 1 dose  Dispense: 1 tablet; Refill: 0  -     ibuprofen (ADVIL,MOTRIN) 800 MG tablet; Take 1 tablet (800 mg total) by mouth 3 (three) times daily. for 5 days  Dispense: 15 tablet; Refill: 0  -     albuterol (VENTOLIN HFA) 90 mcg/actuation inhaler; Inhale 2 puffs into the lungs every 6 (six) hours as needed. Rescue  Dispense: 18 g; Refill: 0           INSTRUCTIONS:  - Rest.  - Drink plenty of fluids.  - Take Tylenol and/or Ibuprofen as directed as needed for fever/pain.  Do not take more than the recommended dose.  - follow up with your PCP within the next 1-2 weeks as needed.  - You must understand that you have received an Urgent Care treatment only and that you may be released before all of your medical problems are known or treated.   - You, the patient, will arrange for follow up care as instructed.   - If your condition worsens or fails  to improve we recommend that you receive another evaluation at the ER immediately or contact your PCP to discuss your concerns.   - You can call [phone] or [phone] to help schedule an appointment with the appropriate provider.     -If you smoke cigarettes, it would be beneficial for you to stop.

## 2024-12-18 RX ORDER — OSELTAMIVIR PHOSPHATE 6 MG/ML
75 FOR SUSPENSION ORAL 2 TIMES DAILY
Qty: 1 ML | Refills: 0 | Status: SHIPPED | OUTPATIENT
Start: 2024-12-18 | End: 2024-12-23

## 2025-03-28 ENCOUNTER — OFFICE VISIT (OUTPATIENT)
Dept: URGENT CARE | Facility: CLINIC | Age: 45
End: 2025-03-28
Payer: COMMERCIAL

## 2025-03-28 VITALS
OXYGEN SATURATION: 98 % | BODY MASS INDEX: 40.73 KG/M2 | DIASTOLIC BLOOD PRESSURE: 90 MMHG | TEMPERATURE: 99 F | HEART RATE: 101 BPM | SYSTOLIC BLOOD PRESSURE: 144 MMHG | RESPIRATION RATE: 17 BRPM | HEIGHT: 69 IN | WEIGHT: 275 LBS

## 2025-03-28 DIAGNOSIS — H66.92 LEFT OTITIS MEDIA, UNSPECIFIED OTITIS MEDIA TYPE: Primary | ICD-10-CM

## 2025-03-28 DIAGNOSIS — R09.81 SINUS CONGESTION: ICD-10-CM

## 2025-03-28 DIAGNOSIS — R05.1 ACUTE COUGH: ICD-10-CM

## 2025-03-28 LAB
CTP QC/QA: YES
CTP QC/QA: YES
POC MOLECULAR INFLUENZA A AGN: NEGATIVE
POC MOLECULAR INFLUENZA B AGN: NEGATIVE
SARS CORONAVIRUS 2 ANTIGEN: NEGATIVE

## 2025-03-28 RX ORDER — AZELASTINE 1 MG/ML
1 SPRAY, METERED NASAL 2 TIMES DAILY PRN
Qty: 30 ML | Refills: 0 | Status: SHIPPED | OUTPATIENT
Start: 2025-03-28

## 2025-03-28 RX ORDER — AMOXICILLIN 875 MG/1
875 TABLET, FILM COATED ORAL EVERY 12 HOURS
Qty: 14 TABLET | Refills: 0 | Status: SHIPPED | OUTPATIENT
Start: 2025-03-28 | End: 2025-04-04

## 2025-03-28 RX ORDER — ALBUTEROL SULFATE 90 UG/1
2 INHALANT RESPIRATORY (INHALATION) EVERY 6 HOURS PRN
Qty: 18 G | Refills: 0 | Status: SHIPPED | OUTPATIENT
Start: 2025-03-28 | End: 2026-03-28

## 2025-03-28 NOTE — PATIENT INSTRUCTIONS
INSTRUCTIONS:  - Rest.  - Drink plenty of fluids.  - Take Tylenol and/or Ibuprofen as directed as needed for fever/pain.  Do not take more than the recommended dose.  - follow up with your PCP within the next 1-2 weeks as needed.  - You must understand that you have received an Urgent Care treatment only and that you may be released before all of your medical problems are known or treated.   - You, the patient, will arrange for follow up care as instructed.   - If your condition worsens or fails to improve we recommend that you receive another evaluation at the ER immediately or contact your PCP to discuss your concerns.   - You can call [phone] or [phone] to help schedule an appointment with the appropriate provider.     -If you smoke cigarettes, it would be beneficial for you to stop.    OVER THE COUNTER RECOMMENDATIONS/SUGGESTIONS.     Make sure to stay well hydrated.     Use Nasal Saline to mechanically move any post nasal drip from your eustachian tube or from the back of your throat.     Use warm salt water gargles to ease your throat pain. Warm salt water gargles as needed for sore throat-  1/2 tsp salt to 1 cup warm water, gargle as desired.     Use an antihistamine such as Claritin, Zyrtec or Allegra to dry you out.      Use pseudoephedrine (behind the counter) to decongest. Pseudoephedrine  30 mg up to 240 mg /day. It can raise your blood pressure and give you palpitations.     Use mucinex (guaifenisin) to break up mucous up to 2400mg/day to loosen any mucous.   The mucinex DM pill has a cough suppressant that can be sedating. It can be used at night to stop the tickle at the back of your throat.  You can use Mucinex D (it has guaifenesin and a high dose of pseudoephedrine) in the mornings to help decongest.        Use Flonase 1-2 sprays/nostril per day. It is a local acting steroid nasal spray, if you develop a bloody nose, stop using the medication immediately.     Sometimes Nyquil at night  is beneficial to help you get some rest, however it is sedating and it does have an antihistamine, and tylenol.     Honey is a natural cough suppressant that can be used.     Tylenol up to 4,000 mg a day is safe for short periods and can be used for body aches, pain, and fever. However in high doses and prolonged use it can cause liver irritation.     Ibuprofen is a non-steroidal anti-inflammatory that can be used for body aches, pain, and fever.However it can also cause stomach irritation if over used.

## 2025-03-28 NOTE — PROGRESS NOTES
"Subjective:      Patient ID: Christina Montano is a 45 y.o. female.    Vitals:  height is 5' 9" (1.753 m) and weight is 124.7 kg (275 lb). Her oral temperature is 99.3 °F (37.4 °C). Her blood pressure is 144/90 (abnormal) and her pulse is 101. Her respiration is 17 and oxygen saturation is 98%.     Chief Complaint: Sinus Problem    Pt came in today with complaints of a post nasal drip, cough, green color mucus that started Wednesday. She has been taking OTC allergy medications for her symptoms    Sinus Problem  This is a new problem. The current episode started in the past 7 days. The problem has been gradually worsening since onset. There has been no fever. Associated symptoms include congestion, coughing and sinus pressure. Pertinent negatives include no chills, diaphoresis, ear pain or sore throat. Treatments tried: allergy medications. The treatment provided no relief.       Constitution: Negative. Negative for chills, sweating and fatigue.   HENT:  Positive for congestion and sinus pressure. Negative for ear pain, facial swelling and sore throat.    Neck: Negative for painful lymph nodes.   Cardiovascular: Negative.  Negative for chest trauma, chest pain and sob on exertion.   Eyes: Negative.  Negative for eye itching and eye pain.   Respiratory:  Positive for cough. Negative for chest tightness and asthma.    Gastrointestinal: Negative.  Negative for nausea, vomiting and diarrhea.   Endocrine: negative. cold intolerance and excessive thirst.   Genitourinary: Negative.  Negative for dysuria, frequency, urgency and hematuria.   Musculoskeletal:  Negative for pain, trauma and joint pain.   Skin: Negative.  Negative for rash, wound and hives.   Allergic/Immunologic: Negative.  Negative for eczema, asthma, hives and itching.   Neurological: Negative.  Negative for disorientation and altered mental status.   Hematologic/Lymphatic: Negative.  Negative for swollen lymph nodes.   Psychiatric/Behavioral: Negative.  Negative " for altered mental status, disorientation and confusion.       Objective:     Physical Exam   Constitutional: She is oriented to person, place, and time. She appears well-developed. She is cooperative.  Non-toxic appearance. She does not appear ill. No distress.   HENT:   Head: Normocephalic and atraumatic.   Ears:   Right Ear: Hearing, tympanic membrane, external ear and ear canal normal. no impacted cerumen  Left Ear: Hearing, external ear and ear canal normal. There is swelling. Tympanic membrane is injected and erythematous. no impacted cerumen  Nose: Mucosal edema and congestion present. No rhinorrhea or nasal deformity. No epistaxis. Right sinus exhibits no maxillary sinus tenderness and no frontal sinus tenderness. Left sinus exhibits no maxillary sinus tenderness and no frontal sinus tenderness.   Mouth/Throat: Uvula is midline, oropharynx is clear and moist and mucous membranes are normal. No trismus in the jaw. Normal dentition. No uvula swelling. No oropharyngeal exudate, posterior oropharyngeal edema, posterior oropharyngeal erythema, tonsillar abscesses or cobblestoning. Tonsils are 0 on the right. Tonsils are 0 on the left. No tonsillar exudate.   Eyes: Conjunctivae and lids are normal. No scleral icterus.   Neck: Trachea normal and phonation normal. Neck supple. No edema present. No erythema present. No neck rigidity present.   Cardiovascular: Normal rate, regular rhythm, normal heart sounds and normal pulses.   Pulmonary/Chest: Effort normal and breath sounds normal. No stridor. No respiratory distress. She has no decreased breath sounds. She has no wheezes. She has no rhonchi. She has no rales. She exhibits no tenderness.   Abdominal: Normal appearance.   Musculoskeletal: Normal range of motion.         General: No deformity. Normal range of motion.   Lymphadenopathy:     She has no cervical adenopathy.   Neurological: no focal deficit. She is alert, oriented to person, place, and time and at  baseline. She exhibits normal muscle tone. Coordination normal.   Skin: Skin is warm, dry, intact, not diaphoretic and not pale.   Psychiatric: Her speech is normal and behavior is normal. Mood, judgment and thought content normal.   Nursing note and vitals reviewed.    Results for orders placed or performed in visit on 03/28/25   POCT Influenza A/B MOLECULAR    Collection Time: 03/28/25  3:39 PM   Result Value Ref Range    POC Molecular Influenza A Ag Negative Negative    POC Molecular Influenza B Ag Negative Negative     Acceptable Yes    SARS Coronavirus 2 Antigen, POCT Manual Read    Collection Time: 03/28/25  3:40 PM   Result Value Ref Range    SARS Coronavirus 2 Antigen Negative Negative, Presumptive Negative     Acceptable Yes          Assessment:     1. Left otitis media, unspecified otitis media type    2. Sinus congestion    3. Acute cough        Plan:   Discussed all negative testing done today with patient.      Short dose of prednisone offered for nasal congestion/sx.  Patient deferred at this time.  She reports if she fails to fully improve she will call clinic and request the prednisone Rx.    FOLLOWUP  Follow up if symptoms worsen or fail to improve, for PLEASE CONTACT PCP OR CONTACT THE EMERGENCY ROOM..     PATIENT INSTRUCTIONS  Patient Instructions   INSTRUCTIONS:  - Rest.  - Drink plenty of fluids.  - Take Tylenol and/or Ibuprofen as directed as needed for fever/pain.  Do not take more than the recommended dose.  - follow up with your PCP within the next 1-2 weeks as needed.  - You must understand that you have received an Urgent Care treatment only and that you may be released before all of your medical problems are known or treated.   - You, the patient, will arrange for follow up care as instructed.   - If your condition worsens or fails to improve we recommend that you receive another evaluation at the ER immediately or contact your PCP to discuss your concerns.    - You can call [phone] or [phone] to help schedule an appointment with the appropriate provider.     -If you smoke cigarettes, it would be beneficial for you to stop.    OVER THE COUNTER RECOMMENDATIONS/SUGGESTIONS.     Make sure to stay well hydrated.     Use Nasal Saline to mechanically move any post nasal drip from your eustachian tube or from the back of your throat.     Use warm salt water gargles to ease your throat pain. Warm salt water gargles as needed for sore throat-  1/2 tsp salt to 1 cup warm water, gargle as desired.     Use an antihistamine such as Claritin, Zyrtec or Allegra to dry you out.      Use pseudoephedrine (behind the counter) to decongest. Pseudoephedrine  30 mg up to 240 mg /day. It can raise your blood pressure and give you palpitations.     Use mucinex (guaifenisin) to break up mucous up to 2400mg/day to loosen any mucous.   The mucinex DM pill has a cough suppressant that can be sedating. It can be used at night to stop the tickle at the back of your throat.  You can use Mucinex D (it has guaifenesin and a high dose of pseudoephedrine) in the mornings to help decongest.        Use Flonase 1-2 sprays/nostril per day. It is a local acting steroid nasal spray, if you develop a bloody nose, stop using the medication immediately.     Sometimes Nyquil at night is beneficial to help you get some rest, however it is sedating and it does have an antihistamine, and tylenol.     Honey is a natural cough suppressant that can be used.     Tylenol up to 4,000 mg a day is safe for short periods and can be used for body aches, pain, and fever. However in high doses and prolonged use it can cause liver irritation.     Ibuprofen is a non-steroidal anti-inflammatory that can be used for body aches, pain, and fever.However it can also cause stomach irritation if over used.         THANK YOU FOR ALLOWING ME TO PARTICIPATE IN YOUR HEALTHCARE,     Vargas Grover NP     Left otitis media,  unspecified otitis media type  -     amoxicillin (AMOXIL) 875 MG tablet; Take 1 tablet (875 mg total) by mouth every 12 (twelve) hours. for 7 days  Dispense: 14 tablet; Refill: 0    Sinus congestion  -     POCT Influenza A/B MOLECULAR  -     SARS Coronavirus 2 Antigen, POCT Manual Read  -     azelastine (ASTELIN) 137 mcg (0.1 %) nasal spray; 1 spray (137 mcg total) by Nasal route 2 (two) times daily as needed for Rhinitis.  Dispense: 30 mL; Refill: 0    Acute cough  -     albuterol (VENTOLIN HFA) 90 mcg/actuation inhaler; Inhale 2 puffs into the lungs every 6 (six) hours as needed for Wheezing. Rescue  Dispense: 18 g; Refill: 0